# Patient Record
Sex: MALE | NOT HISPANIC OR LATINO | Employment: FULL TIME | URBAN - NONMETROPOLITAN AREA
[De-identification: names, ages, dates, MRNs, and addresses within clinical notes are randomized per-mention and may not be internally consistent; named-entity substitution may affect disease eponyms.]

---

## 2017-10-11 ENCOUNTER — OFFICE VISIT (OUTPATIENT)
Dept: RETAIL CLINIC | Facility: CLINIC | Age: 42
End: 2017-10-11

## 2017-10-11 DIAGNOSIS — R30.9 PAIN WITH URINATION: ICD-10-CM

## 2017-10-11 DIAGNOSIS — R09.1 PLEURISY: Primary | ICD-10-CM

## 2017-10-11 NOTE — PROGRESS NOTES
Mr. Valdes is having difficulty urinating and was explained to him we are not set up to see males with gu problems. He also feels he has pleurisy and has evidence in a folder for which he says is proof. I feel he would be better served at an urgent care of emergency department.